# Patient Record
Sex: MALE | Race: WHITE
[De-identification: names, ages, dates, MRNs, and addresses within clinical notes are randomized per-mention and may not be internally consistent; named-entity substitution may affect disease eponyms.]

---

## 2017-02-13 ENCOUNTER — HOSPITAL ENCOUNTER (OUTPATIENT)
Dept: HOSPITAL 61 - PCVCIMAG | Age: 72
Discharge: HOME | End: 2017-02-13
Attending: INTERNAL MEDICINE
Payer: MEDICARE

## 2017-02-13 DIAGNOSIS — R06.00: ICD-10-CM

## 2017-02-13 DIAGNOSIS — I95.9: ICD-10-CM

## 2017-02-13 DIAGNOSIS — Z95.1: ICD-10-CM

## 2017-02-13 DIAGNOSIS — I48.91: Primary | ICD-10-CM

## 2017-02-13 PROCEDURE — 93351 STRESS TTE COMPLETE: CPT

## 2017-02-13 PROCEDURE — 93325 DOPPLER ECHO COLOR FLOW MAPG: CPT

## 2017-08-22 ENCOUNTER — HOSPITAL ENCOUNTER (OUTPATIENT)
Dept: HOSPITAL 61 - PCVCCLINIC | Age: 72
Discharge: HOME | End: 2017-08-22
Attending: INTERNAL MEDICINE
Payer: MEDICARE

## 2017-08-22 DIAGNOSIS — E78.5: ICD-10-CM

## 2017-08-22 DIAGNOSIS — I25.10: Primary | ICD-10-CM

## 2017-08-22 DIAGNOSIS — Z96.652: ICD-10-CM

## 2017-08-22 DIAGNOSIS — Z95.1: ICD-10-CM

## 2017-08-22 DIAGNOSIS — I48.91: ICD-10-CM

## 2017-08-22 DIAGNOSIS — I65.23: ICD-10-CM

## 2017-08-22 DIAGNOSIS — I10: ICD-10-CM

## 2017-08-22 DIAGNOSIS — Z95.5: ICD-10-CM

## 2017-08-22 DIAGNOSIS — Z87.891: ICD-10-CM

## 2017-08-22 DIAGNOSIS — G47.33: ICD-10-CM

## 2017-08-22 PROCEDURE — 80061 LIPID PANEL: CPT

## 2017-08-22 PROCEDURE — G0463 HOSPITAL OUTPT CLINIC VISIT: HCPCS

## 2018-03-01 ENCOUNTER — HOSPITAL ENCOUNTER (OUTPATIENT)
Dept: HOSPITAL 61 - PCVCCLINIC | Age: 73
Discharge: HOME | End: 2018-03-01
Attending: INTERNAL MEDICINE
Payer: MEDICARE

## 2018-03-01 DIAGNOSIS — R94.31: ICD-10-CM

## 2018-03-01 DIAGNOSIS — I10: ICD-10-CM

## 2018-03-01 DIAGNOSIS — Z79.899: ICD-10-CM

## 2018-03-01 DIAGNOSIS — I65.23: ICD-10-CM

## 2018-03-01 DIAGNOSIS — I25.10: ICD-10-CM

## 2018-03-01 DIAGNOSIS — I48.2: Primary | ICD-10-CM

## 2018-03-01 DIAGNOSIS — Z87.891: ICD-10-CM

## 2018-03-01 DIAGNOSIS — G47.33: ICD-10-CM

## 2018-03-01 DIAGNOSIS — E78.5: ICD-10-CM

## 2018-03-01 DIAGNOSIS — Z95.1: ICD-10-CM

## 2018-03-01 PROCEDURE — 80061 LIPID PANEL: CPT

## 2018-03-01 PROCEDURE — 93005 ELECTROCARDIOGRAM TRACING: CPT

## 2018-09-04 ENCOUNTER — HOSPITAL ENCOUNTER (OUTPATIENT)
Dept: HOSPITAL 61 - PCVCCLINIC | Age: 73
Discharge: HOME | End: 2018-09-04
Attending: INTERNAL MEDICINE
Payer: MEDICARE

## 2018-09-04 DIAGNOSIS — I25.10: Primary | ICD-10-CM

## 2018-09-04 DIAGNOSIS — Z87.891: ICD-10-CM

## 2018-09-04 DIAGNOSIS — I10: ICD-10-CM

## 2018-09-04 DIAGNOSIS — G47.33: ICD-10-CM

## 2018-09-04 DIAGNOSIS — I65.23: ICD-10-CM

## 2018-09-04 DIAGNOSIS — Z95.1: ICD-10-CM

## 2018-09-04 DIAGNOSIS — I48.2: ICD-10-CM

## 2018-09-04 PROCEDURE — G0463 HOSPITAL OUTPT CLINIC VISIT: HCPCS

## 2018-09-04 PROCEDURE — 93005 ELECTROCARDIOGRAM TRACING: CPT

## 2018-09-04 PROCEDURE — 80061 LIPID PANEL: CPT

## 2019-03-21 ENCOUNTER — HOSPITAL ENCOUNTER (OUTPATIENT)
Dept: HOSPITAL 61 - PCVCIMAG | Age: 74
Discharge: HOME | End: 2019-03-21
Attending: INTERNAL MEDICINE
Payer: MEDICARE

## 2019-03-21 DIAGNOSIS — Z95.1: ICD-10-CM

## 2019-03-21 DIAGNOSIS — E78.5: ICD-10-CM

## 2019-03-21 DIAGNOSIS — I48.2: ICD-10-CM

## 2019-03-21 DIAGNOSIS — I25.10: Primary | ICD-10-CM

## 2019-03-21 DIAGNOSIS — E78.2: ICD-10-CM

## 2019-03-21 DIAGNOSIS — E78.00: ICD-10-CM

## 2019-03-21 PROCEDURE — 93325 DOPPLER ECHO COLOR FLOW MAPG: CPT

## 2019-03-21 PROCEDURE — 93351 STRESS TTE COMPLETE: CPT

## 2019-03-21 NOTE — PCVCIMAG
--------------- APPROVED REPORT --------------





Study performed:  03/21/2019 10:04:14



Exam:  Stress Echocardiogram

Indication: Atrial Fibrillation, CAD s/p CABG (2001), Hyperlipidemia, 

Hypertension

Patient Location: Echo lab

Stress Nurse: Alina Agarwal RN

Room #: 2

Status: routine



Ht: 5 ft 6 in  

HR: 77 bpm      BP: 136/64 mmHg

Rhythm: Atrial Fibrillation



Procedure

The patient underwent an Exercise Stress Test using the Junior 

Protocol. Blood pressure, heart rate, and EKG were monitored.

An Echocardiogram was performed by technician in four stages in quad 

fashion.  At peak stress, four selected images were obtained and 

placed side by side with resting images for comparison.



Stress Test Details

Stress Test:  Exercise stress testing was performed using a Junior 

protocol.

HR

Resting HR:            77 bpmMax Heart Rate (APMHR): 146 bpm 

Max HR Achieved:  142 bpmTarget HR (85% APMHR): 124 bpm

% of APMHR:         97

Recovery HR:            78 bpm

HR response to stress: Normal HR response to stress



BP

Resting BP:  136/64 mmHg

Max BP:       180/60 mmHg

Recovery BP:       132/76 mmHg

BP response to stress: Normal blood pressure response to 

stress.

ECG

Resting ECG:  Atrial Fibrillation

Stress ECG:     Atrial Fibrillation

ST Change: Normal

Maximum ST Deviation: 0 mm

Arrhythmia:    VPC's

Recovery ECG: Atrial Fibrillation

Recovery ST Change: Normal

Recovery ST Deviation: 0 mm

Recovery Arrhythmia: None



Clinical

Reason for Termination: Maximal effort

Exercise duration: 6 min 01 sec

Highest Stage Achieved: Stage 2: 2.5 mph at 12% grade. 

Exercise capacity: 7.00 METs

Overall Exercise Capacity for Age: Average

Angina Score: None



Stress ECG Conclusion

ECG: Non-ischemic

Clinical: Non-ischemic

[facility] Treadmill Score is 6.0 which is Low risk.



Pre-Stress Echo

The resting Echocardiogram showed normal left ventricular 

contractility with an estimated Ejection Fraction of about >55%. 

Normal wall motion in all segments on baseline images.



Post-Stress Echo

The stress Echocardiogram showed normal left ventricular 

contractility with an estimated Ejection Fraction of about 60-65%. 

Normal augmentation of wall motion in all segments on post stress 

images.



Clinical

No clinical or ECG evidence for ischemia.



Conclusion

Clinical Response:  Non-ischemic

Exercise Capacity:  Average

Stress ECG Response:  Non-ischemic

Stress Echo Images:  Non-ischemic

The left ventricle is normal in size and wall thickness in both the 

rest and stress images.

Normal stress echocardiogram with maximal exercise stress. 



Other Information

Study Quality: Adequate



<Conclusion>

The left ventricle is normal in size and wall thickness in both the 

rest and stress images.

Normal stress echocardiogram with maximal exercise stress.

## 2019-10-16 ENCOUNTER — HOSPITAL ENCOUNTER (OUTPATIENT)
Dept: HOSPITAL 61 - PCVCCLINIC | Age: 74
Discharge: HOME | End: 2019-10-16
Attending: INTERNAL MEDICINE
Payer: MEDICARE

## 2019-10-16 DIAGNOSIS — I48.21: ICD-10-CM

## 2019-10-16 DIAGNOSIS — Z82.49: ICD-10-CM

## 2019-10-16 DIAGNOSIS — Z79.899: ICD-10-CM

## 2019-10-16 DIAGNOSIS — Z87.891: ICD-10-CM

## 2019-10-16 DIAGNOSIS — Z95.1: ICD-10-CM

## 2019-10-16 DIAGNOSIS — G47.33: ICD-10-CM

## 2019-10-16 DIAGNOSIS — Z72.89: ICD-10-CM

## 2019-10-16 DIAGNOSIS — Z82.41: ICD-10-CM

## 2019-10-16 DIAGNOSIS — I65.23: ICD-10-CM

## 2019-10-16 DIAGNOSIS — E78.00: ICD-10-CM

## 2019-10-16 DIAGNOSIS — E78.5: ICD-10-CM

## 2019-10-16 DIAGNOSIS — I10: ICD-10-CM

## 2019-10-16 DIAGNOSIS — I25.10: Primary | ICD-10-CM

## 2019-10-16 PROCEDURE — 80061 LIPID PANEL: CPT

## 2019-10-16 PROCEDURE — G0463 HOSPITAL OUTPT CLINIC VISIT: HCPCS

## 2019-10-16 PROCEDURE — 36415 COLL VENOUS BLD VENIPUNCTURE: CPT

## 2019-10-16 PROCEDURE — 93005 ELECTROCARDIOGRAM TRACING: CPT
